# Patient Record
Sex: FEMALE | Employment: OTHER | URBAN - METROPOLITAN AREA
[De-identification: names, ages, dates, MRNs, and addresses within clinical notes are randomized per-mention and may not be internally consistent; named-entity substitution may affect disease eponyms.]

---

## 2023-12-14 ENCOUNTER — OFFICE VISIT (OUTPATIENT)
Dept: URGENT CARE | Facility: CLINIC | Age: 63
End: 2023-12-14
Payer: MEDICARE

## 2023-12-14 VITALS
WEIGHT: 182.8 LBS | OXYGEN SATURATION: 92 % | TEMPERATURE: 97.7 F | SYSTOLIC BLOOD PRESSURE: 127 MMHG | HEART RATE: 82 BPM | DIASTOLIC BLOOD PRESSURE: 74 MMHG

## 2023-12-14 DIAGNOSIS — J45.41 MODERATE PERSISTENT ASTHMA WITH EXACERBATION (HHS-HCC): ICD-10-CM

## 2023-12-14 DIAGNOSIS — B37.9 ANTIBIOTIC-INDUCED YEAST INFECTION: Primary | ICD-10-CM

## 2023-12-14 DIAGNOSIS — T36.95XA ANTIBIOTIC-INDUCED YEAST INFECTION: Primary | ICD-10-CM

## 2023-12-14 DIAGNOSIS — J22 LOWER RESPIRATORY INFECTION (E.G., BRONCHITIS, PNEUMONIA, PNEUMONITIS, PULMONITIS): ICD-10-CM

## 2023-12-14 PROCEDURE — 3074F SYST BP LT 130 MM HG: CPT

## 2023-12-14 PROCEDURE — 99204 OFFICE O/P NEW MOD 45 MIN: CPT

## 2023-12-14 PROCEDURE — 1160F RVW MEDS BY RX/DR IN RCRD: CPT

## 2023-12-14 PROCEDURE — 1159F MED LIST DOCD IN RCRD: CPT

## 2023-12-14 PROCEDURE — 3078F DIAST BP <80 MM HG: CPT

## 2023-12-14 RX ORDER — OMEPRAZOLE 40 MG/1
CAPSULE, DELAYED RELEASE ORAL
COMMUNITY

## 2023-12-14 RX ORDER — HYDROCHLOROTHIAZIDE 25 MG/1
25 TABLET ORAL
COMMUNITY
Start: 2023-10-20

## 2023-12-14 RX ORDER — BENZONATATE 200 MG/1
200 CAPSULE ORAL 3 TIMES DAILY PRN
Qty: 21 CAPSULE | Refills: 0 | Status: SHIPPED | OUTPATIENT
Start: 2023-12-14 | End: 2023-12-21

## 2023-12-14 RX ORDER — AMOXICILLIN AND CLAVULANATE POTASSIUM 875; 125 MG/1; MG/1
1 TABLET, FILM COATED ORAL 2 TIMES DAILY
Qty: 20 TABLET | Refills: 0 | Status: SHIPPED | OUTPATIENT
Start: 2023-12-14 | End: 2023-12-24

## 2023-12-14 RX ORDER — LISINOPRIL 40 MG/1
40 TABLET ORAL DAILY
COMMUNITY
Start: 2023-10-20

## 2023-12-14 RX ORDER — PREDNISONE 10 MG/1
TABLET ORAL
Qty: 16 TABLET | Refills: 0 | Status: SHIPPED | OUTPATIENT
Start: 2023-12-14 | End: 2023-12-24

## 2023-12-14 RX ORDER — FLUTICASONE PROPIONATE AND SALMETEROL 250; 50 UG/1; UG/1
1 POWDER RESPIRATORY (INHALATION) 2 TIMES DAILY
COMMUNITY
Start: 2023-10-20

## 2023-12-14 RX ORDER — FLUCONAZOLE 150 MG/1
TABLET ORAL
Qty: 2 TABLET | Refills: 0 | Status: SHIPPED | OUTPATIENT
Start: 2023-12-14

## 2023-12-14 RX ORDER — BROMPHENIRAMINE MALEATE, PSEUDOEPHEDRINE HYDROCHLORIDE, AND DEXTROMETHORPHAN HYDROBROMIDE 2; 30; 10 MG/5ML; MG/5ML; MG/5ML
10 SYRUP ORAL 4 TIMES DAILY PRN
Qty: 120 ML | Refills: 0 | Status: SHIPPED | OUTPATIENT
Start: 2023-12-14 | End: 2023-12-24

## 2023-12-14 ASSESSMENT — ENCOUNTER SYMPTOMS
SINUS PAIN: 1
ABDOMINAL PAIN: 0
SINUS PRESSURE: 1
GASTROINTESTINAL NEGATIVE: 1
DIAPHORESIS: 0
SORE THROAT: 0
DIZZINESS: 0
CONSTITUTIONAL NEGATIVE: 1
MYALGIAS: 0
PALPITATIONS: 0
SHORTNESS OF BREATH: 1
MUSCULOSKELETAL NEGATIVE: 1
FEVER: 0
VOMITING: 0
NEUROLOGICAL NEGATIVE: 1
DIARRHEA: 0
TROUBLE SWALLOWING: 0
COUGH: 1
HEADACHES: 0
CHILLS: 0
CHEST TIGHTNESS: 1
CARDIOVASCULAR NEGATIVE: 1
RHINORRHEA: 1

## 2023-12-14 NOTE — PATIENT INSTRUCTIONS
LOWER RESPIRATORY INFECTION / ASTHMA EXACERBATION      - NEBULIZED ALBUTEROL / IPRATROPIUM treatment was offered in office, but patient declined at this time     - AMOXICILLIN - CLAVULANATE (Augmentin) is an antibiotic that has been prescribed to fight infection in your lungs    - BENZONATATE (Tessalon perles) have been prescribed to help reduce cough    - Mucinex should be used to help thin mucus making it easier to clear and reduce nasal congestion     - PREDNISONE (steroid) has been prescribed to reduce inflammation in the airways      - Continue using ALBUTEROL for quick acting relief of shortness of breath     - Recommended follow-up with PCP or pulmonologist ASAP       - Use Ibuprofen (Advil) or Acetaminophen (Tylenol) at home for headache and fever reduction if needed     - Recommended use of OTC cough and cold syrups like Dayquil/Nyquil or Mucinex containing guaifenesin (thins mucus), phenylephrine (nasal decongestant) , and/or dextromethorphan (cough suppresant) if not using Bromfed syrup RX         - Increase rest and fluids to recover sooner and loosen mucus     - May use a humidifier, cough drops, saline nasal wash (Neti pot) for symptom relief     - Avoid cigarette smoke and do not vape as this will continue to irritate airway (may use nicotine gum or patches if nicotine dependent)    - If any chest pain or difficulty breathing occurs please go to the ER  - If no improvement after 7-10 days, please follow up with PCP, if you need a referral, please call our office.     If chest tightness, arm or jaw pain, shortness of breath that does not go away with use of inhalers, or skin losing color/turning blue occurs go to the ER immediately    Steroids are strong medications that mimic the body's natural production of cortisol and is prescribed to reduce inflammation especially when pain is caused by inflammation.  Short-term side effects of steroids reviewed, including gastritis, insomnia, moodiness, acne,  fluid retention (leg swelling) and potential for increase in blood pressure or sugar.  Steroids are often prescribed to be tapered off into lower doses after prolonged use to avoid withdrawal symptoms such as fatigue, weakness, body aches, nausea, and lightheadedness.  However, if a severe reaction is noted (including rash or difficulty breathing) discontinue and seek emergency care immediately.     Antibiotics fight against and reduce all bacteria in your body.  Yeast infections, abdominal pain, and diarrhea are very common side effects of taking antibiotics, as the good bacteria is destroyed. Taking supplemental probiotics or eating probiotic yogurt (Activia, Chobani, Kefir) can help replace the bacteria and restore balance preventing or relieving these side effects.     ** It is very important that you complete the whole prescription of antibiotics to kill all of the bacteria causing illness even if symptoms improve. If any of the targeted bacteria survives and returns to cause infection, it will then be stronger and more resistant to antibiotic medications in the future.

## 2023-12-14 NOTE — PROGRESS NOTES
Subjective     Kiara Menard is a 63 y.o. female who presents for Sinusitis.    Patient presents with reports of persistent sinus congestion/drainage, cough, fever/chills, and shortness of breath for the last 7-8 days. She reports a history of persistent sinusitis and asthma. Patient states that she wants a z-pack for her symptoms since that is what would get from her doctors in the past, but they would not send anything in for her without her being seen this time. She reports trying mucinex and her prescribed inhalers with mild relief. Patient reports headache, fevers, and body aches at the beginning of the illness which has resolved.         History provided by:  Patient and medical records  Sinusitis  Context: not smoke inhalation    Relieved by:  Nothing  Worsened by:  Lying down and position changes  Ineffective treatments:  Inhaler use, lying down and oral decongestants  Associated symptoms: congestion, cough, rhinorrhea and shortness of breath    Associated symptoms: no chest pain, no chills, no ear pain, no fever, no headaches, no sore throat and no vomiting        /74   Pulse 82   Temp 36.5 °C (97.7 °F)   Wt 82.9 kg (182 lb 12.8 oz)   SpO2 92%    All vitals have been reviewed and are stable.    Review of Systems   Constitutional: Negative.  Negative for chills, diaphoresis and fever.   HENT:  Positive for congestion, postnasal drip, rhinorrhea, sinus pressure and sinus pain. Negative for ear pain, sore throat and trouble swallowing.    Respiratory:  Positive for cough, chest tightness and shortness of breath.    Cardiovascular: Negative.  Negative for chest pain and palpitations.   Gastrointestinal: Negative.  Negative for abdominal pain, diarrhea and vomiting.   Musculoskeletal: Negative.  Negative for myalgias.   Skin: Negative.  Negative for rash.   Neurological: Negative.  Negative for dizziness and headaches.       Objective   Physical Exam  Vitals and nursing note reviewed.   Constitutional:        General: She is not in acute distress.     Appearance: Normal appearance. She is ill-appearing.   HENT:      Head: Normocephalic and atraumatic.      Right Ear: External ear normal.      Left Ear: External ear normal.      Nose: Mucosal edema, congestion and rhinorrhea present.      Mouth/Throat:      Lips: Pink.      Mouth: Mucous membranes are moist.      Palate: No lesions.      Pharynx: Uvula midline. Oropharyngeal exudate and posterior oropharyngeal erythema present.      Tonsils: No tonsillar exudate.   Eyes:      Extraocular Movements: Extraocular movements intact.      Conjunctiva/sclera: Conjunctivae normal.      Right eye: Right conjunctiva is not injected.      Left eye: Left conjunctiva is not injected.      Pupils: Pupils are equal, round, and reactive to light.   Cardiovascular:      Rate and Rhythm: Normal rate and regular rhythm.      Heart sounds: Normal heart sounds.   Pulmonary:      Effort: Pulmonary effort is normal. No respiratory distress.      Breath sounds: Normal air entry. No stridor. Examination of the right-lower field reveals rhonchi. Wheezing and rhonchi present. No rales.   Abdominal:      General: Abdomen is flat.      Palpations: Abdomen is soft.   Musculoskeletal:         General: Normal range of motion.      Cervical back: Normal range of motion and neck supple.   Lymphadenopathy:      Cervical: No cervical adenopathy.   Skin:     General: Skin is warm and dry.   Neurological:      General: No focal deficit present.      Mental Status: She is alert and oriented to person, place, and time. Mental status is at baseline.   Psychiatric:         Mood and Affect: Mood normal.         Behavior: Behavior normal.         Assessment/Plan   Problem List Items Addressed This Visit    None  Visit Diagnoses       Antibiotic-induced yeast infection    -  Primary    Relevant Medications    fluconazole (Diflucan) 150 mg tablet    Lower respiratory infection (e.g., bronchitis, pneumonia,  pneumonitis, pulmonitis)        Relevant Medications    amoxicillin-pot clavulanate (Augmentin) 875-125 mg tablet    predniSONE (Deltasone) 10 mg tablet    brompheniramine-pseudoeph-DM 2-30-10 mg/5 mL syrup    benzonatate (Tessalon) 200 mg capsule    Other Relevant Orders    XR chest 2 views    Moderate persistent asthma with exacerbation        Relevant Medications    amoxicillin-pot clavulanate (Augmentin) 875-125 mg tablet    predniSONE (Deltasone) 10 mg tablet    brompheniramine-pseudoeph-DM 2-30-10 mg/5 mL syrup    benzonatate (Tessalon) 200 mg capsule    Other Relevant Orders    XR chest 2 views            Red flags for reporting to ER have been reviewed with the patient.    Current diagnosis, any medication changes, and all in-office lab or radiologic results have been reviewed with the patient at the time of the visit.   If symptoms do not improve or worsen, patient is to follow up with PCP or report to the emergency room.   Patient is alert and oriented x3 and non-toxic appearing. Vital signs are stable.   Patient and/or guardian has sufficient decision-making capabilities at this time and reports understanding and agreement with the treatment plan made through shared decision-making.

## 2024-11-21 ENCOUNTER — OFFICE VISIT (OUTPATIENT)
Dept: URGENT CARE | Facility: CLINIC | Age: 64
End: 2024-11-21
Payer: MEDICARE

## 2024-11-21 VITALS
WEIGHT: 183 LBS | HEART RATE: 92 BPM | TEMPERATURE: 98.2 F | OXYGEN SATURATION: 92 % | SYSTOLIC BLOOD PRESSURE: 113 MMHG | DIASTOLIC BLOOD PRESSURE: 77 MMHG

## 2024-11-21 DIAGNOSIS — J45.41 MODERATE PERSISTENT ASTHMA WITH EXACERBATION (HHS-HCC): ICD-10-CM

## 2024-11-21 DIAGNOSIS — T36.95XA ANTIBIOTIC-INDUCED YEAST INFECTION: ICD-10-CM

## 2024-11-21 DIAGNOSIS — B37.9 ANTIBIOTIC-INDUCED YEAST INFECTION: ICD-10-CM

## 2024-11-21 DIAGNOSIS — J01.40 SUBACUTE PANSINUSITIS: Primary | ICD-10-CM

## 2024-11-21 PROCEDURE — 99213 OFFICE O/P EST LOW 20 MIN: CPT

## 2024-11-21 PROCEDURE — 3074F SYST BP LT 130 MM HG: CPT

## 2024-11-21 PROCEDURE — 3078F DIAST BP <80 MM HG: CPT

## 2024-11-21 RX ORDER — FLUCONAZOLE 150 MG/1
TABLET ORAL
Qty: 2 TABLET | Refills: 0 | Status: SHIPPED | OUTPATIENT
Start: 2024-11-21

## 2024-11-21 RX ORDER — AMOXICILLIN AND CLAVULANATE POTASSIUM 875; 125 MG/1; MG/1
1 TABLET, FILM COATED ORAL 2 TIMES DAILY
Qty: 14 TABLET | Refills: 0 | Status: SHIPPED | OUTPATIENT
Start: 2024-11-21 | End: 2024-11-28

## 2024-11-21 RX ORDER — AMOXICILLIN 500 MG/1
TABLET, FILM COATED ORAL
COMMUNITY
Start: 2024-01-23 | End: 2024-11-21 | Stop reason: ALTCHOICE

## 2024-11-21 RX ORDER — MONTELUKAST SODIUM 10 MG/1
TABLET ORAL
COMMUNITY

## 2024-11-21 RX ORDER — INHALER, ASSIST DEVICES
SPACER (EA) MISCELLANEOUS
COMMUNITY
Start: 2024-04-04

## 2024-11-21 RX ORDER — DULOXETIN HYDROCHLORIDE 30 MG/1
1 CAPSULE, DELAYED RELEASE ORAL
COMMUNITY
Start: 2024-01-18

## 2024-11-21 RX ORDER — FAMOTIDINE 40 MG/1
40 TABLET, FILM COATED ORAL NIGHTLY
COMMUNITY
Start: 2024-09-23

## 2024-11-21 RX ORDER — ALENDRONATE SODIUM 70 MG/1
70 TABLET ORAL
COMMUNITY

## 2024-11-21 RX ORDER — BROMPHENIRAMINE MALEATE, PSEUDOEPHEDRINE HYDROCHLORIDE, AND DEXTROMETHORPHAN HYDROBROMIDE 2; 30; 10 MG/5ML; MG/5ML; MG/5ML
10 SYRUP ORAL 4 TIMES DAILY PRN
Qty: 120 ML | Refills: 0 | Status: SHIPPED | OUTPATIENT
Start: 2024-11-21 | End: 2024-12-01

## 2024-11-21 RX ORDER — PREDNISONE 10 MG/1
TABLET ORAL
COMMUNITY
Start: 2024-04-04 | End: 2024-11-21 | Stop reason: ALTCHOICE

## 2024-11-21 RX ORDER — MELOXICAM 15 MG/1
TABLET ORAL
COMMUNITY
Start: 2024-01-15

## 2024-11-21 RX ORDER — BUPROPION HYDROCHLORIDE 300 MG/1
TABLET ORAL
COMMUNITY
Start: 2024-11-17

## 2024-11-21 RX ORDER — ALBUTEROL SULFATE 90 UG/1
INHALANT RESPIRATORY (INHALATION)
COMMUNITY

## 2024-11-21 RX ORDER — PREDNISONE 10 MG/1
TABLET ORAL
Qty: 21 TABLET | Refills: 0 | Status: SHIPPED | OUTPATIENT
Start: 2024-11-21 | End: 2024-12-01

## 2024-11-21 RX ORDER — AMOXICILLIN AND CLAVULANATE POTASSIUM 875; 125 MG/1; MG/1
1 TABLET, FILM COATED ORAL
COMMUNITY
Start: 2024-07-18 | End: 2024-11-21 | Stop reason: ALTCHOICE

## 2024-11-21 RX ORDER — BENZONATATE 200 MG/1
200 CAPSULE ORAL 3 TIMES DAILY PRN
Qty: 21 CAPSULE | Refills: 0 | Status: SHIPPED | OUTPATIENT
Start: 2024-11-21 | End: 2024-11-28

## 2024-11-21 RX ORDER — METHYLPREDNISOLONE 4 MG/1
TABLET ORAL
COMMUNITY
Start: 2024-01-15 | End: 2024-11-21 | Stop reason: ALTCHOICE

## 2024-11-21 RX ORDER — ROSUVASTATIN CALCIUM 10 MG/1
1 TABLET, COATED ORAL
COMMUNITY
Start: 2024-10-10

## 2024-11-21 ASSESSMENT — ENCOUNTER SYMPTOMS
STRIDOR: 0
FACIAL SWELLING: 0
VOICE CHANGE: 0
DIZZINESS: 0
NAUSEA: 0
CHEST TIGHTNESS: 0
DIARRHEA: 0
ABDOMINAL PAIN: 0
ARTHRALGIAS: 0
RHINORRHEA: 1
COLOR CHANGE: 0
CHILLS: 0
WEAKNESS: 0
PALPITATIONS: 0
SINUS PRESSURE: 1
CARDIOVASCULAR NEGATIVE: 1
SHORTNESS OF BREATH: 1
MYALGIAS: 0
FEVER: 0
FATIGUE: 1
SINUS PAIN: 1
VOMITING: 0
WHEEZING: 1
TROUBLE SWALLOWING: 0
COUGH: 1
WOUND: 0
SORE THROAT: 1
LIGHT-HEADEDNESS: 0
MUSCULOSKELETAL NEGATIVE: 1
GASTROINTESTINAL NEGATIVE: 1
HEADACHES: 1
DIAPHORESIS: 0
EYE REDNESS: 0

## 2024-11-21 ASSESSMENT — VISUAL ACUITY: OU: 1

## 2024-11-21 NOTE — PROGRESS NOTES
Subjective   History  Kiara Menard is a 64 y.o. female who presents for Sinusitis.    Patient presents with sinus congestion/drainage, productive cough, and facial pressure worsening for the last 2 weeks. She reports a history of sinus infections and seasonal allergies.      History provided by:  Patient and medical records  Sinusitis  Pain details:     Quality:  Aching    Duration:  2 weeks    Timing:  Constant  Progression:  Worsening  Context: allergies and recent URI    Associated symptoms: congestion, cough, fatigue, headaches, rhinorrhea, shortness of breath, sore throat and wheezing    Associated symptoms: no chest pain, no chills, no ear pain, no fever, no nausea and no vomiting    Congestion:     Location:  Nasal  Risk factors: asthma      No past surgical history on file.       Review of Systems   Review of Systems   Constitutional:  Positive for fatigue. Negative for chills, diaphoresis and fever.   HENT:  Positive for congestion, postnasal drip, rhinorrhea, sinus pressure, sinus pain and sore throat. Negative for ear discharge, ear pain, facial swelling, trouble swallowing and voice change.    Eyes:  Negative for redness.   Respiratory:  Positive for cough, shortness of breath and wheezing. Negative for chest tightness and stridor.    Cardiovascular: Negative.  Negative for chest pain and palpitations.   Gastrointestinal: Negative.  Negative for abdominal pain, diarrhea, nausea and vomiting.   Musculoskeletal: Negative.  Negative for arthralgias and myalgias.   Skin: Negative.  Negative for color change, rash and wound.   Allergic/Immunologic: Positive for environmental allergies.   Neurological:  Positive for headaches. Negative for dizziness, weakness and light-headedness.       Objective   Vital Signs  /77   Pulse 92   Temp 36.8 °C (98.2 °F)   Wt 83 kg (183 lb)   LMP  (LMP Unknown)   SpO2 92%    All vitals have been reviewed and are stable.     Physical Exam  Physical Exam  Vitals and  nursing note reviewed.   Constitutional:       General: She is not in acute distress.     Appearance: Normal appearance. She is ill-appearing.   HENT:      Head: Normocephalic and atraumatic. No right periorbital erythema or left periorbital erythema.      Jaw: There is normal jaw occlusion.      Right Ear: External ear normal.      Left Ear: External ear normal.      Nose: Mucosal edema, congestion and rhinorrhea present.      Right Sinus: Maxillary sinus tenderness and frontal sinus tenderness present.      Left Sinus: Maxillary sinus tenderness and frontal sinus tenderness present.      Mouth/Throat:      Lips: Pink. No lesions.      Mouth: Mucous membranes are moist.      Palate: No lesions.      Pharynx: Uvula midline. Posterior oropharyngeal erythema and postnasal drip present. No uvula swelling.      Tonsils: No tonsillar exudate.   Eyes:      General: Lids are normal. Vision grossly intact.         Right eye: No discharge.         Left eye: No discharge.      Extraocular Movements: Extraocular movements intact.      Conjunctiva/sclera: Conjunctivae normal.      Right eye: Right conjunctiva is not injected.      Left eye: Left conjunctiva is not injected.      Pupils: Pupils are equal, round, and reactive to light.   Cardiovascular:      Rate and Rhythm: Normal rate and regular rhythm.      Heart sounds: Normal heart sounds.   Pulmonary:      Effort: Pulmonary effort is normal. No tachypnea, accessory muscle usage or respiratory distress.      Breath sounds: Normal breath sounds and air entry. Transmitted upper airway sounds present. No stridor. No wheezing, rhonchi or rales.   Abdominal:      General: Abdomen is flat.      Palpations: Abdomen is soft.   Musculoskeletal:         General: Normal range of motion.      Cervical back: Full passive range of motion without pain, normal range of motion and neck supple.   Lymphadenopathy:      Cervical: No cervical adenopathy.   Skin:     General: Skin is warm and  dry.      Coloration: Skin is not pale or sallow.      Findings: No erythema, petechiae or rash.   Neurological:      General: No focal deficit present.      Mental Status: She is alert and oriented to person, place, and time. Mental status is at baseline.   Psychiatric:         Mood and Affect: Mood normal.         Behavior: Behavior normal.         Diagnostic Results   No results found for this or any previous visit (from the past 48 hours).    Assessment/Plan   Procedures   N/A    Problem List Items Addressed This Visit    None  Visit Diagnoses       Subacute pansinusitis    -  Primary    Relevant Medications    amoxicillin-pot clavulanate (Augmentin) 875-125 mg tablet    predniSONE (Deltasone) 10 mg tablet    brompheniramine-pseudoeph-DM 2-30-10 mg/5 mL syrup    Moderate persistent asthma with exacerbation ([facility])        Relevant Medications    amoxicillin-pot clavulanate (Augmentin) 875-125 mg tablet    predniSONE (Deltasone) 10 mg tablet    brompheniramine-pseudoeph-DM 2-30-10 mg/5 mL syrup    benzonatate (Tessalon) 200 mg capsule    Antibiotic-induced yeast infection        Relevant Medications    fluconazole (Diflucan) 150 mg tablet            [facility] Course  Patient disposition: Home    Red flags for reporting to ER have been reviewed with the patient.    Current diagnosis, any medication changes, and all in-office lab or radiologic results have been reviewed with the patient at the time of the visit.   If symptoms do not improve or worsen, patient is to follow up with PCP or report to the emergency room.   Patient is alert and oriented x3 and non-toxic appearing. Vital signs are stable.   Patient and/or guardian has sufficient decision-making capabilities at this time and reports understanding and agreement with the treatment plan made through shared decision-making.

## 2025-04-02 ENCOUNTER — OFFICE VISIT (OUTPATIENT)
Dept: URGENT CARE | Facility: CLINIC | Age: 65
End: 2025-04-02
Payer: MEDICARE

## 2025-04-02 VITALS
WEIGHT: 176.4 LBS | HEIGHT: 65 IN | OXYGEN SATURATION: 93 % | BODY MASS INDEX: 29.39 KG/M2 | TEMPERATURE: 97.8 F | DIASTOLIC BLOOD PRESSURE: 84 MMHG | HEART RATE: 94 BPM | SYSTOLIC BLOOD PRESSURE: 139 MMHG

## 2025-04-02 DIAGNOSIS — R06.2 WHEEZING: ICD-10-CM

## 2025-04-02 DIAGNOSIS — J45.41 MODERATE PERSISTENT ASTHMA WITH EXACERBATION (HHS-HCC): Primary | ICD-10-CM

## 2025-04-02 DIAGNOSIS — R09.02 HYPOXIA: ICD-10-CM

## 2025-04-02 PROCEDURE — 1159F MED LIST DOCD IN RCRD: CPT | Performed by: PHYSICIAN ASSISTANT

## 2025-04-02 PROCEDURE — 1036F TOBACCO NON-USER: CPT | Performed by: PHYSICIAN ASSISTANT

## 2025-04-02 PROCEDURE — 3075F SYST BP GE 130 - 139MM HG: CPT | Performed by: PHYSICIAN ASSISTANT

## 2025-04-02 PROCEDURE — 99214 OFFICE O/P EST MOD 30 MIN: CPT | Performed by: PHYSICIAN ASSISTANT

## 2025-04-02 PROCEDURE — 3008F BODY MASS INDEX DOCD: CPT | Performed by: PHYSICIAN ASSISTANT

## 2025-04-02 PROCEDURE — 3079F DIAST BP 80-89 MM HG: CPT | Performed by: PHYSICIAN ASSISTANT

## 2025-04-02 RX ORDER — DILTIAZEM HYDROCHLORIDE 60 MG/1
TABLET, FILM COATED ORAL
COMMUNITY
Start: 2025-04-02

## 2025-04-02 RX ORDER — PREDNISONE 20 MG/1
TABLET ORAL
Qty: 13 TABLET | Refills: 0 | Status: SHIPPED | OUTPATIENT
Start: 2025-04-02 | End: 2025-04-10

## 2025-04-02 NOTE — PROGRESS NOTES
Subjective   Patient ID: Kiara Menard is a 64 y.o. female.    C/o wheezing and SOB x 5 days. She contacted her PCP and they refilled her Advair this AM, has not picked it up yet. Denies: fever, chills, headache, dizziness, CP, abdominal pain, N/V/D, rash, swelling, bruising, ear pain, sore throat, loss of sense of taste/smell. She has been using Ventolin with little relief. She does not have nebulizer at home, denies needing one today. States that she is former smoker, quit 10 years ago.         Review of Systems   All other systems reviewed and are negative.      Objective     Physical Exam  Vitals reviewed.   Constitutional:       General: She is awake.      Appearance: Normal appearance. She is well-developed.   HENT:      Head: Normocephalic and atraumatic.   Cardiovascular:      Rate and Rhythm: Normal rate.   Pulmonary:      Effort: Pulmonary effort is normal. No accessory muscle usage, prolonged expiration, respiratory distress or retractions.      Breath sounds: Decreased air movement present. Decreased breath sounds and wheezing (inspiratory and expiratory) present. No rhonchi or rales.   Musculoskeletal:      Cervical back: Full passive range of motion without pain.      Right lower leg: No edema.      Left lower leg: No edema.   Skin:     General: Skin is warm and dry.      Findings: No lesion or rash.   Neurological:      General: No focal deficit present.      Mental Status: She is alert and oriented to person, place, and time.      Cranial Nerves: No facial asymmetry.      Motor: Motor function is intact.      Gait: Gait is intact.   Psychiatric:         Attention and Perception: Attention normal.         Mood and Affect: Mood and affect normal.           Assessment/Plan   Problem List Items Addressed This Visit    None  Visit Diagnoses         Codes    Moderate persistent asthma with exacerbation ([facility])    -  Primary J45.41    Relevant Medications    predniSONE (Deltasone) 20 mg tablet    Wheezing      R06.2    Relevant Medications    predniSONE (Deltasone) 20 mg tablet    Hypoxia     R09.02    Relevant Medications    predniSONE (Deltasone) 20 mg tablet          Pt declined breathing treatment, asked twice  Requesting steroids, as this has helped in the past  Given her exam findings and O2 levels, I believe this is appropriate  Fill this with your Advair  Can continue Ventolin PRN     Red flag symptoms reviewed with patient and all questions answered. Patient or parent/guardian verbalized understanding and agreement with care plan as above. All in office testing reviewed with patient. If symptoms worsen or do not improve, patient is to follow up with PCP or report to the ER.    Patient disposition: Home